# Patient Record
Sex: MALE
[De-identification: names, ages, dates, MRNs, and addresses within clinical notes are randomized per-mention and may not be internally consistent; named-entity substitution may affect disease eponyms.]

---

## 2018-06-03 ENCOUNTER — HOSPITAL ENCOUNTER (EMERGENCY)
Dept: HOSPITAL 14 - H.ER | Age: 53
Discharge: HOME | End: 2018-06-03
Payer: COMMERCIAL

## 2018-06-03 VITALS — RESPIRATION RATE: 18 BRPM | SYSTOLIC BLOOD PRESSURE: 131 MMHG | HEART RATE: 70 BPM | DIASTOLIC BLOOD PRESSURE: 83 MMHG

## 2018-06-03 VITALS — TEMPERATURE: 97 F | OXYGEN SATURATION: 97 %

## 2018-06-03 VITALS — BODY MASS INDEX: 39.2 KG/M2

## 2018-06-03 DIAGNOSIS — J06.9: Primary | ICD-10-CM

## 2018-06-03 NOTE — ED PDOC
HPI: CCC, URI, Sore Throat


Time Seen by Provider: 06/03/18 09:02


Chief Complaint (Nursing): Cough, Cold, Congestion


History Per: Patient


Onset/Duration Of Symptoms: Other (1 year)


Current Symptoms Are (Timing): Still Present


Location Of Pain: Throat


Sick Contacts (Context): None


Associated Symptoms: Sore Throat, Nasal Congestion.  denies: Fever


Severity: Moderate


Additional Complaint(s): 





Nasal congestion and sore throat x 1 year. Denies fever or cough. No SOB





Past Medical History


Vital Signs: 





 Last Vital Signs











Temp  97 F L  06/03/18 08:35


 


Pulse  67   06/03/18 08:35


 


Resp      


 


BP  130/85   06/03/18 08:35


 


Pulse Ox  97   06/03/18 08:35














- Medical History


PMH: No Chronic Diseases





- Family History


Family History: States: Unknown Family Hx





- Home Medications


Home Medications: 


 Ambulatory Orders











 Medication  Instructions  Recorded


 


Azithromycin [Zithromax] 250 mg PO DAILY #6 tab 06/03/18


 


Fluticasone Nasal [Flonase] 1 actuation NS DAILY #1 spr 06/03/18














- Allergies


Allergies/Adverse Reactions: 


 Allergies











Allergy/AdvReac Type Severity Reaction Status Date / Time


 


No Known Allergies Allergy   Verified 09/07/16 20:17














Review of Systems


Constitutional: Negative for: Fever


ENT: Positive for: Nose Congestion, Throat Pain


Respiratory: Negative for: Cough, Shortness of Breath





Physical Exam





- Physical Exam


Appears: Positive for: Non-toxic, No Acute Distress


Skin: Positive for: Normal Color, Warm, DRY


ENT: Positive for: Nasal Congestion, Pharyngeal Erythema, Tonsillar Swelling.  

Negative for: Sinus Pain/Drainage, Tonsillar Exudate


Neck: Positive for: Normal, Painless ROM


Cardiovascular/Chest: Positive for: Regular Rate, Rhythm


Respiratory: Positive for: CNT, Normal Breath Sounds


Extremity: Positive for: Normal ROM


Neurologic/Psych: Positive for: Alert, Oriented





- ECG


O2 Sat by Pulse Oximetry: 97





Disposition





- Clinical Impression


Clinical Impression: 


 URI (upper respiratory infection)








- Patient ED Disposition


Is Patient to be Admitted: No


Counseled Patient/Family Regarding: Diagnosis, Need For Followup, Rx Given





- Disposition


Referrals: 


Behin,Babak, MD [Staff Provider] - 


Disposition: Routine/Home


Disposition Time: 09:13


Condition: FAIR


Prescriptions: 


Azithromycin [Zithromax] 250 mg PO DAILY #6 tab


Fluticasone Nasal [Flonase] 1 actuation NS DAILY #1 spr


Instructions:  Bacterial Upper Respiratory Infection, Adult


Print Language: Tamazight